# Patient Record
Sex: FEMALE | Race: WHITE
[De-identification: names, ages, dates, MRNs, and addresses within clinical notes are randomized per-mention and may not be internally consistent; named-entity substitution may affect disease eponyms.]

---

## 2018-03-17 ENCOUNTER — HOSPITAL ENCOUNTER (EMERGENCY)
Dept: HOSPITAL 17 - NEPC | Age: 83
LOS: 1 days | Discharge: HOME | End: 2018-03-18
Payer: MEDICARE

## 2018-03-17 VITALS
TEMPERATURE: 98.8 F | DIASTOLIC BLOOD PRESSURE: 85 MMHG | RESPIRATION RATE: 20 BRPM | SYSTOLIC BLOOD PRESSURE: 211 MMHG | OXYGEN SATURATION: 98 % | HEART RATE: 76 BPM

## 2018-03-17 VITALS
SYSTOLIC BLOOD PRESSURE: 107 MMHG | HEART RATE: 75 BPM | RESPIRATION RATE: 16 BRPM | DIASTOLIC BLOOD PRESSURE: 57 MMHG | OXYGEN SATURATION: 96 %

## 2018-03-17 VITALS — HEIGHT: 66 IN | WEIGHT: 187.39 LBS | BODY MASS INDEX: 30.12 KG/M2

## 2018-03-17 VITALS
HEART RATE: 72 BPM | OXYGEN SATURATION: 97 % | SYSTOLIC BLOOD PRESSURE: 190 MMHG | DIASTOLIC BLOOD PRESSURE: 88 MMHG | RESPIRATION RATE: 18 BRPM

## 2018-03-17 DIAGNOSIS — Z79.899: ICD-10-CM
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
## 2018-03-17 DIAGNOSIS — I49.8: ICD-10-CM

## 2018-03-17 DIAGNOSIS — I10: ICD-10-CM

## 2018-03-17 DIAGNOSIS — R51: Primary | ICD-10-CM

## 2018-03-17 DIAGNOSIS — R94.31: ICD-10-CM

## 2018-03-17 DIAGNOSIS — B96.1: ICD-10-CM

## 2018-03-17 DIAGNOSIS — I51.7: ICD-10-CM

## 2018-03-17 LAB
ALBUMIN SERPL-MCNC: 3.1 GM/DL (ref 3.4–5)
ALP SERPL-CCNC: 157 U/L (ref 45–117)
ALT SERPL-CCNC: 13 U/L (ref 10–53)
AST SERPL-CCNC: 17 U/L (ref 15–37)
BACTERIA #/AREA URNS HPF: (no result) /HPF
BASOPHILS # BLD AUTO: 0.1 TH/MM3 (ref 0–0.2)
BASOPHILS NFR BLD: 0.8 % (ref 0–2)
BILIRUB SERPL-MCNC: 0.3 MG/DL (ref 0.2–1)
BUN SERPL-MCNC: 18 MG/DL (ref 7–18)
CALCIUM SERPL-MCNC: 8.6 MG/DL (ref 8.5–10.1)
CHLORIDE SERPL-SCNC: 109 MEQ/L (ref 98–107)
COLOR UR: YELLOW
CREAT SERPL-MCNC: 0.9 MG/DL (ref 0.5–1)
EOSINOPHIL # BLD: 0.2 TH/MM3 (ref 0–0.4)
EOSINOPHIL NFR BLD: 3 % (ref 0–4)
ERYTHROCYTE [DISTWIDTH] IN BLOOD BY AUTOMATED COUNT: 14.6 % (ref 11.6–17.2)
GFR SERPLBLD BASED ON 1.73 SQ M-ARVRAT: 60 ML/MIN (ref 89–?)
GLUCOSE SERPL-MCNC: 99 MG/DL (ref 74–106)
GLUCOSE UR STRIP-MCNC: (no result) MG/DL
HCO3 BLD-SCNC: 26.4 MEQ/L (ref 21–32)
HCT VFR BLD CALC: 38.9 % (ref 35–46)
HGB BLD-MCNC: 13.3 GM/DL (ref 11.6–15.3)
HGB UR QL STRIP: (no result)
KETONES UR STRIP-MCNC: (no result) MG/DL
LYMPHOCYTES # BLD AUTO: 2.3 TH/MM3 (ref 1–4.8)
LYMPHOCYTES NFR BLD AUTO: 28.2 % (ref 9–44)
MCH RBC QN AUTO: 30 PG (ref 27–34)
MCHC RBC AUTO-ENTMCNC: 34.1 % (ref 32–36)
MCV RBC AUTO: 88 FL (ref 80–100)
MONOCYTE #: 0.6 TH/MM3 (ref 0–0.9)
MONOCYTES NFR BLD: 6.7 % (ref 0–8)
NEUTROPHILS # BLD AUTO: 5.1 TH/MM3 (ref 1.8–7.7)
NEUTROPHILS NFR BLD AUTO: 61.3 % (ref 16–70)
NITRITE UR QL STRIP: (no result)
PLATELET # BLD: 183 TH/MM3 (ref 150–450)
PMV BLD AUTO: 9.8 FL (ref 7–11)
PROT SERPL-MCNC: 6.8 GM/DL (ref 6.4–8.2)
RBC # BLD AUTO: 4.43 MIL/MM3 (ref 4–5.3)
SODIUM SERPL-SCNC: 143 MEQ/L (ref 136–145)
SP GR UR STRIP: 1.01 (ref 1–1.03)
SQUAMOUS #/AREA URNS HPF: 2 /HPF (ref 0–5)
TROPONIN I SERPL-MCNC: (no result) NG/ML (ref 0.02–0.05)
URINE LEUKOCYTE ESTERASE: (no result)
WBC # BLD AUTO: 8.3 TH/MM3 (ref 4–11)
WHITE BLOOD CELL CLUMPS: (no result)

## 2018-03-17 PROCEDURE — 87086 URINE CULTURE/COLONY COUNT: CPT

## 2018-03-17 PROCEDURE — 87077 CULTURE AEROBIC IDENTIFY: CPT

## 2018-03-17 PROCEDURE — 81001 URINALYSIS AUTO W/SCOPE: CPT

## 2018-03-17 PROCEDURE — 85025 COMPLETE CBC W/AUTO DIFF WBC: CPT

## 2018-03-17 PROCEDURE — 99285 EMERGENCY DEPT VISIT HI MDM: CPT

## 2018-03-17 PROCEDURE — 72125 CT NECK SPINE W/O DYE: CPT

## 2018-03-17 PROCEDURE — 87186 SC STD MICRODIL/AGAR DIL: CPT

## 2018-03-17 PROCEDURE — 80053 COMPREHEN METABOLIC PANEL: CPT

## 2018-03-17 PROCEDURE — 70450 CT HEAD/BRAIN W/O DYE: CPT

## 2018-03-17 PROCEDURE — 93005 ELECTROCARDIOGRAM TRACING: CPT

## 2018-03-17 PROCEDURE — 84484 ASSAY OF TROPONIN QUANT: CPT

## 2018-03-17 NOTE — RADRPT
EXAM DATE/TIME:  03/17/2018 21:05 

 

[facility] COMPARISON:     

No previous studies available for comparison.

 

 

INDICATIONS :     

Trauma, fall.

                      

 

RADIATION DOSE:     

24.40 CTDIvol (mGy) 

 

 

 

MEDICAL HISTORY :     

None  

 

SURGICAL HISTORY :      

None. 

 

ENCOUNTER:      

Initial

 

ACUITY:      

1 day

 

PAIN SCALE:      

0/10

 

LOCATION:        

neck 

 

TECHNIQUE:     

Volumetric scanning of the cervical spine was performed. Multiplanar reconstructions in the sagittal,
 coronal and oblique axial planes were performed.   Using automated exposure control and adjustment o
f the mA and/or kV according to patient size, radiation dose was kept as low as reasonably achievable
 to obtain optimal diagnostic quality images.   DICOM format image data is available electronically f
or review and comparison.  

 

FINDINGS:     

 

VERTEBRAE:     

Normal vertebral body height.

 

ALIGNMENT:     

No evidence of subluxation.

 

C2-C3:  

The bony spinal canal is normal in size.  No evidence of disc bulge or herniation.  The neural forami
na are bilaterally patent.

 

C3-C4:  

The bony spinal canal is normal in size.  No evidence of disc bulge or herniation.  The neural forami
na are bilaterally patent.

 

C4-C5:  

The bony spinal canal is normal in size.  No evidence of disc bulge or herniation.  The neural forami
na are bilaterally patent.

 

C5-C6:  

The bony spinal canal is normal in size.  No evidence of disc bulge or herniation.  The neural forami
na are bilaterally patent.

 

C6-C7:  

The bony spinal canal is normal in size.  No evidence of disc bulge or herniation.  The neural forami
na are bilaterally patent.

 

C7-T1:  

The bony spinal canal is normal in size.  No evidence of disc bulge or herniation.  The neural forami
na are bilaterally patent.

 

CONCLUSION:     

1. No acute bony abnormalities. No significant canal stenosis. Moderate facet arthropathy. No prevert
ebral soft tissue swelling.

 

 

 

 Huan Ge MD on March 17, 2018 at 21:45           

Board Certified Radiologist.

 This report was verified electronically.

## 2018-03-17 NOTE — PD
HPI


Chief Complaint:  Fall


Time Seen by Provider:  19:21


Travel History


International Travel<30 days:  No


Contact w/Intl Traveler<30days:  No


Traveled to known affect area:  No





History of Present Illness


HPI


Patient is an 82-year-old female presents emergency department for evaluation 

of headache and high blood pressure.  Patient Italian only speaker translation 

services used to obtain her history.  Patient quite pleasant, she is concerned 

because her son had an aneurysm in his head which ruptured and she wants to 

make sure she does not have one.  She states that she was vacuuming and then 

fell backwards losing her balance but denies any syncope, she states she first 

landed on her bottom and then hit her head.  She states that she has a little 

bruise but did see some stars.  She states that she has been on medicine for 

blood pressure in the past but has not taken in several months because she does 

not have any.  Denies any chest pain shortness of breath syncope abdominal pain 

nausea vomiting neck pain or extremity pain.  Symptoms mild, started just prior 

to arrival, context as a fall, associated signs and symptoms as above





PFSH


Past Medical History


*** Narrative Medical


Hypertension, arthritis, GERD





Past Surgical History


Surgical History:  Unable to Obtain





Social History


Tobacco Use:  No





Allergies-Medications


(Allergen,Severity, Reaction):  


Coded Allergies:  


     Penicillins (Verified  Allergy, Severe, 3/17/18)


Reported Meds & Prescriptions





Reported Meds & Active Scripts


Active


Hydrochlorothiazide 12.5 Mg Tab 12.5 Mg PO DAILY


Reported


Omeprazole 40 Mg Cap Unknown Dose  DAILY


[Unk Bp Med]     








Review of Systems


Except as stated in HPI:  all other systems reviewed are Neg





Physical Exam


Narrative


GENERAL: Well-developed well-nourished in no obvious distress


SKIN: Focused skin assessment warm/dry.


HEAD: Atraumatic. Normocephalic. 


EYES: Pupils equal and round. No scleral icterus. No injection or drainage. 


ENT: No nasal bleeding or discharge.  Mucous membranes pink and moist.


NECK: Trachea midline. No JVD. 


CARDIOVASCULAR: Regular rate and rhythm.  No murmur appreciated.


RESPIRATORY: No accessory muscle use. Clear to auscultation. Breath sounds 

equal bilaterally. 


GASTROINTESTINAL: Abdomen soft, non-tender, nondistended. Hepatic and splenic 

margins not palpable. 


MUSCULOSKELETAL: No obvious deformities. No clubbing.  No cyanosis.  No edema. 


NEUROLOGICAL: Awake and alert.  Cranial nerves II through XII grossly intact 

and nonfocal, 5 out of 5 strength in all 4 extremities, cerebellar testing 

negative.


PSYCHIATRIC: Appropriate mood and affect; insight and judgment normal.





Data


Data


Last Documented VS





Vital Signs








  Date Time  Temp Pulse Resp B/P (MAP) Pulse Ox O2 Delivery O2 Flow Rate FiO2


 


3/18/18 00:28        


 


3/17/18 23:49  75 16  96 Room Air  


 


3/17/18 18:58 98.8       








Orders





 Orders


Electrocardiogram (3/17/18 19:28)


Complete Blood Count With Diff (3/17/18 19:28)


Comprehensive Metabolic Panel (3/17/18 19:28)


Troponin I (3/17/18 19:28)


Urinalysis - C+S If Indicated (3/17/18 19:28)


Ct Brain W/O Iv Contrast(Rout) (3/17/18 19:28)


Ct Cerv Spine W/O Contrast (3/17/18 19:28)


Ecg Monitoring (3/17/18 19:28)


Iv Access Insert/Monitor (3/17/18 19:28)


Oximetry (3/17/18 19:28)


Sodium Chloride 0.9% Flush (Ns Flush) (3/17/18 19:30)


Acetaminophen (Tylenol) (3/17/18 21:45)


Clonidine (Catapres) (3/17/18 21:45)


Urine Culture (3/17/18 21:30)


Ed Discharge Order (3/18/18 00:20)





Labs





Laboratory Tests








Test


  3/17/18


20:45 3/17/18


21:30


 


White Blood Count 8.3 TH/MM3  


 


Red Blood Count 4.43 MIL/MM3  


 


Hemoglobin 13.3 GM/DL  


 


Hematocrit 38.9 %  


 


Mean Corpuscular Volume 88.0 FL  


 


Mean Corpuscular Hemoglobin 30.0 PG  


 


Mean Corpuscular Hemoglobin


Concent 34.1 % 


  


 


 


Red Cell Distribution Width 14.6 %  


 


Platelet Count 183 TH/MM3  


 


Mean Platelet Volume 9.8 FL  


 


Neutrophils (%) (Auto) 61.3 %  


 


Lymphocytes (%) (Auto) 28.2 %  


 


Monocytes (%) (Auto) 6.7 %  


 


Eosinophils (%) (Auto) 3.0 %  


 


Basophils (%) (Auto) 0.8 %  


 


Neutrophils # (Auto) 5.1 TH/MM3  


 


Lymphocytes # (Auto) 2.3 TH/MM3  


 


Monocytes # (Auto) 0.6 TH/MM3  


 


Eosinophils # (Auto) 0.2 TH/MM3  


 


Basophils # (Auto) 0.1 TH/MM3  


 


CBC Comment DIFF FINAL  


 


Differential Comment   


 


Blood Urea Nitrogen 18 MG/DL  


 


Creatinine 0.90 MG/DL  


 


Random Glucose 99 MG/DL  


 


Total Protein 6.8 GM/DL  


 


Albumin 3.1 GM/DL  


 


Calcium Level 8.6 MG/DL  


 


Alkaline Phosphatase 157 U/L  


 


Aspartate Amino Transf


(AST/SGOT) 17 U/L 


  


 


 


Alanine Aminotransferase


(ALT/SGPT) 13 U/L 


  


 


 


Total Bilirubin 0.3 MG/DL  


 


Sodium Level 143 MEQ/L  


 


Potassium Level 3.6 MEQ/L  


 


Chloride Level 109 MEQ/L  


 


Carbon Dioxide Level 26.4 MEQ/L  


 


Anion Gap 8 MEQ/L  


 


Estimat Glomerular Filtration


Rate 60 ML/MIN 


  


 


 


Troponin I


  LESS THAN 0.02


NG/ML 


 


 


Urine Color  YELLOW 


 


Urine Turbidity  HAZY 


 


Urine pH  5.5 


 


Urine Specific Gravity  1.014 


 


Urine Protein  NEG mg/dL 


 


Urine Glucose (UA)  NEG mg/dL 


 


Urine Ketones  NEG mg/dL 


 


Urine Occult Blood  NEG 


 


Urine Nitrite  POS 


 


Urine Bilirubin  NEG 


 


Urine Urobilinogen


  


  LESS THAN 2.0


MG/DL


 


Urine Leukocyte Esterase  LARGE 


 


Urine RBC  1 /hpf 


 


Urine WBC  63 /hpf 


 


Urine WBC Clumps  RARE 


 


Urine Squamous Epithelial


Cells 


  2 /hpf 


 


 


Urine Bacteria  MOD /hpf 


 


Microscopic Urinalysis Comment


  


  CULTURE


INDICATED











MDM


Medical Decision Making


Medical Screen Exam Complete:  Yes


Emergency Medical Condition:  Yes


Differential Diagnosis


Head injury, neck injury, hypertensive emergency unlikely, occult subarachnoid 

hemorrhage extremely unlikely


Narrative Course


Patient roomed in the emergency department, she appears well and in no obvious 

distress.  Pain medicine given, clonidine, she is sleeping soundly on 

reassessment, blood pressure is better.  CT head and C-spine are negative for 

acute injury


Last 24 hours Impressions








Head CT 3/17/18 1928 Signed





Impressions: 





 Service Date/Time:  Saturday, March 17, 2018 21:05 - CONCLUSION:  1. No acute 





 intracranial abnormalities.     Huan Ge MD 


 


Cervical Spine CT 3/17/18 1928 Signed





Impressions: 





 Service Date/Time:  Saturday, March 17, 2018 21:05 - CONCLUSION:  1. No acute 





 bony abnormalities. No significant canal stenosis. Moderate facet arthropathy. 





 No prevertebral soft tissue swelling.     Huan Ge MD 





Patient was reassured, family members come to pick her up.  Discussed return to 

ED criteria follow-up with primary care physician.  I will start her on low-

dose hydrochlorothiazide.





Diagnosis





 Primary Impression:  


 Headache


 Additional Impression:  


 Hypertension


***Med/Other Pt SpecificInfo:  Prescription(s) given


Scripts


Hydrochlorothiazide (Hydrochlorothiazide) 12.5 Mg Tab


12.5 MG PO DAILY, #30 TAB 0 Refills


   Prov: Kj Solomon MD         3/18/18


Disposition:  01 DISCHARGE HOME


Condition:  Stable











Kj Solomon MD Mar 17, 2018 20:12

## 2018-03-17 NOTE — RADRPT
EXAM DATE/TIME:  03/17/2018 21:05 

 

[facility] COMPARISON:     

No previous studies available for comparison.

 

 

INDICATIONS :     

Trauma, fall.

                      

 

RADIATION DOSE:     

56.35 CTDIvol (mGy) 

 

 

 

MEDICAL HISTORY :     

None  

 

SURGICAL HISTORY :      

None. 

 

ENCOUNTER:      

Initial

 

ACUITY:      

1 day

 

PAIN SCALE:      

1/10

 

LOCATION:        

cranial 

 

TECHNIQUE:     

Multiple contiguous axial images were obtained of the head.  Using automated exposure control and adj
ustment of the mA and/or kV according to patient size, radiation dose was kept as low as reasonably a
chievable to obtain optimal diagnostic quality images.   DICOM format image data is available electro
nically for review and comparison.  

 

FINDINGS:     

 

CEREBRUM:     

The ventricles are normal for age.  No evidence of midline shift, mass lesion, hemorrhage or acute in
farction.  No extra-axial fluid collections are seen.

 

POSTERIOR FOSSA:     

The cerebellum and brainstem are intact.  The 4th ventricle is midline.  The cerebellopontine angle i
s unremarkable.

 

EXTRACRANIAL:     

The visualized portion of the orbits is intact.

 

SKULL:     

The calvaria is intact.  No evidence of skull fracture.

 

CONCLUSION:     

1. No acute intracranial abnormalities.

 

 

 

 Huan Ge MD on March 17, 2018 at 21:44           

Board Certified Radiologist.

 This report was verified electronically.

## 2018-03-18 NOTE — EKG
Date Performed: 03/17/2018       Time Performed: 21:59:32

 

PTAGE:      82 years

 

EKG:      Sinus rhythm 

 

 WITH SINUS ARRHYTHMIA LEFT VENTRICULAR HYPERTROPHY AND ST-T CHANGE POSSIBLE SEPTAL MYOCARDIAL INFARC
TION ABNORMAL ECG 

 

NO PREVIOUS TRACING            

 

DOCTOR:   Bronson Mcneill  Interpretating Date/Time  03/18/2018 12:32:03